# Patient Record
Sex: FEMALE | Employment: UNEMPLOYED | URBAN - METROPOLITAN AREA
[De-identification: names, ages, dates, MRNs, and addresses within clinical notes are randomized per-mention and may not be internally consistent; named-entity substitution may affect disease eponyms.]

---

## 2024-08-14 ENCOUNTER — TRANSFERRED RECORDS (OUTPATIENT)
Dept: HEALTH INFORMATION MANAGEMENT | Facility: CLINIC | Age: 59
End: 2024-08-14
Payer: COMMERCIAL

## 2024-08-14 LAB — PHQ9 SCORE: 16

## 2024-08-16 ENCOUNTER — MEDICAL CORRESPONDENCE (OUTPATIENT)
Dept: HEALTH INFORMATION MANAGEMENT | Facility: CLINIC | Age: 59
End: 2024-08-16
Payer: COMMERCIAL

## 2024-08-16 ENCOUNTER — TRANSCRIBE ORDERS (OUTPATIENT)
Dept: OTHER | Age: 59
End: 2024-08-16

## 2024-08-16 DIAGNOSIS — Z21 HIV POSITIVE (H): Primary | ICD-10-CM

## 2024-09-18 ENCOUNTER — OFFICE VISIT (OUTPATIENT)
Dept: INFECTIOUS DISEASES | Facility: CLINIC | Age: 59
End: 2024-09-18
Payer: COMMERCIAL

## 2024-09-18 VITALS
TEMPERATURE: 97.6 F | OXYGEN SATURATION: 96 % | HEART RATE: 50 BPM | WEIGHT: 184 LBS | SYSTOLIC BLOOD PRESSURE: 124 MMHG | DIASTOLIC BLOOD PRESSURE: 80 MMHG

## 2024-09-18 DIAGNOSIS — Z21 HIV POSITIVE (H): ICD-10-CM

## 2024-09-18 PROCEDURE — G2211 COMPLEX E/M VISIT ADD ON: HCPCS | Performed by: INTERNAL MEDICINE

## 2024-09-18 PROCEDURE — 99204 OFFICE O/P NEW MOD 45 MIN: CPT | Performed by: INTERNAL MEDICINE

## 2024-09-18 RX ORDER — DARUNAVIR ETHANOLATE AND COBICISTAT 800; 150 MG/1; MG/1
1 TABLET, FILM COATED ORAL DAILY
COMMUNITY
Start: 2024-03-15

## 2024-09-18 RX ORDER — BICTEGRAVIR SODIUM, EMTRICITABINE, AND TENOFOVIR ALAFENAMIDE FUMARATE 50; 200; 25 MG/1; MG/1; MG/1
1 TABLET ORAL DAILY
COMMUNITY
Start: 2024-03-15

## 2024-09-18 RX ORDER — CHOLECALCIFEROL (VITAMIN D3) 1250 MCG
50000 CAPSULE ORAL WEEKLY
COMMUNITY
Start: 2024-05-25

## 2024-09-18 RX ORDER — SERTRALINE HYDROCHLORIDE 100 MG/1
100 TABLET, FILM COATED ORAL DAILY
COMMUNITY

## 2024-09-18 RX ORDER — QUETIAPINE FUMARATE 100 MG/1
100 TABLET, FILM COATED ORAL 2 TIMES DAILY
COMMUNITY

## 2024-09-18 RX ORDER — ALPRAZOLAM 2 MG
2 TABLET ORAL
COMMUNITY
Start: 2024-06-27

## 2024-09-18 NOTE — PROGRESS NOTES
General ID Service Consult      Patient: Luda Pacheco  YOB: 1965, MRN: [id number]  Date of Admission:  (Not on file)  Date of Consult: 09/18/2024  Consult Requested by: No att. providers found  Admission Diagnosis: HIV positive (H) [Z21]  Consult Question:     ID Assessment & Plan   Patient diagnosed with HIV in 1994 with screening whilst pregnant, with CD4 bharat of 280?, on antiretroviral regimen of Bik/Prezcobix since 2018. Undetectable. CD4 537  Moved from [place]  QFG negative  Syphilis negative  HCV negative  Co-morbidities of insomnia, gastritis, depression/anxiety and migraines.   TIA  Becca  Just had a mammogram       PLAN    Flu shots yearly  Covid vaccine recommended  Followup 6 months with labs  Pap smear per primary      Chacha Conrad MD    ______________________________________________________________________        History of Present Illness   Luda Pacheco is a 59 year old female who was diagnosed with HIV in 1994 with screening whilst pregnant, with CD4 bharat of 280?, on antiretroviral regimen of Bik/Prezcobix since 2018. Co-morbidities of insomnia, gastritis, depression/anxiety and migraines.   She has moved here from [place] around June and now is established with the [facility] clinic.  [facility] feels fine, no GI issues no respiratory complaints  No fevers  No complications from HIV  Just had a mammogram   She reports menopause without period for 3 years but has had 1 vaginal bleed apparently about few months ago.  We discussed that she needs to have that checked out as soon as possible.    One partner male HIV negative    agnostic Review and Results:  Laboratory  - Cd4:  Lab Results   Component Value Date   CD4ABS 356 (L) 04/08/2022   CD4ABS 281 (L) 11/12/2021   CD4ABS 271 (L) 07/12/2021   OX9ROMIMGH 20.9 (L) 04/08/2022   GK2CTGTVYZ 20.4 (L) 11/12/2021   IA3YXBUOOP 23.7 (L) 07/12/2021     - HIV RNA Viral Load:  Lab Results   Component Value Date   RNAHIVCOP <20 (H) 06/07/2021    RNAHIVCOP <40 (H) 10/26/2020   HIVRNACOPNOT Not Detected 11/29/2012   COPIESML <20 Detected, Not Quantified (!) 04/08/2022   COPIESML <20 07/11/2016   COPIESML <20 03/10/2016       Meds  Medications prior to visit as below.  - Any changes to medications since last visit?: no  - Outside or OTC meds/supplements: no  - Medication AEs: n/a  - Medication Adherence: missed multiple doses monthly. Has had difficulty getting refills but has since switched pharmacies  Current Medications   Medication Sig    eiidgddcn-ttzucduo-xyrumup (BIKTARVY) -25 mg tab TAKE 1 TABLET BY MOUTH EVERY DAY    darunavir-cobicistat (PREZCOBIX) 800-150 mg-mg tablet tablet Take 1 tablet by mouth daily.    PURELAX 17 gram/dose powder TAKE 1 CAPFUL BY MOUTH ONCE FOR 1 DOSE. TAKE AS DIRECTED. SPLIT-BOWEL PREP DOSING.    brimonidine 0.33 % GlwP Apply 1 Pump topically daily. Apply thin layer to face, avoid eyes and lips, wash hands immediately after application; avoid open wounds    ALPRAZolam (XANAX) 1 mg tab TAKE 1 TO 2 TABLETS BY MOUTH AT BEDTIME AS NEEDED    QUEtiapine (SEROQUEL) 100 mg tab TAKE 0.5 1 TABLET BY MOUTH AT BEDTIME USING 1/2 1 TABLET AS NEEDED FOR SLEEP AND MOOD *LM NEW INS     mmunization History   Administered Date(s) Administered    Covid-19 (Moderna) 04/02/2021, 05/01/2021    Hepatitis B 03/01/2010, 03/01/2010, 05/19/2011    Hepatitis B, Adult 02/01/2010, 03/01/2010, 05/19/2011    Hepatitis B, Dialysis 12/04/2007, 03/06/2008, 06/19/2008    Influenza (36+ months; preservative-free) 01/13/2016, 12/22/2016    Influenza (6-35 months) 12/04/2007, 11/19/2009    Pneumococcal conjugate PCV13 04/21/2014    Pneumococcal polysaccharide PPV23 03/06/2008, 10/05/2010    Tdap 10/05/2010    influenza 10/05/2010, 12/13/2012, 10/28/2014    influenza (6+ months; preservative-free) 02/06/2019         Review of Systems   The 10 point Review of Systems is negative other than noted in the HPI or here.     Past Medical History    No past medical  "history on file.    Past Surgical History   No past surgical history on file.    Social History   Social History     Tobacco Use    Smoking status: Never    Tobacco comments:     no second hand smoke   Substance Use Topics    Alcohol use: Yes     Comment: occassionally    Drug use: No       Family History     FAMILY HISTORY      Medications   I have reviewed this patient's current medications    Allergies   No Known Allergies    Physical Exam   Vital Signs:                    Weight: 0 lbs 0 oz    Gen. appearance nontoxic  Eyes no conjunctivitis or icterus  Neck no stiffness or neck vein distention, no LN  Heart  no S3 no mumurs  Lungs clear no wheeze  Abdomen soft not tender  Extremities no synovitis, trace edema  Skin  no rash or emboli  Neurologic alert oriented no focal deficits      Data   omponent  Ref Range & Units 3 mo ago   HIV RNA Copies  0 - 0 Copies/mL <20 High    HIV RNA Log  Log(copies/mL) <1.30     Inflammatory Markers No lab results found.     Hematology Studies No lab results found.    Metabolic Studies No lab results found.    Hepatic Studies  No lab results found.    Most Recent 6 Bacteria Isolates From Any Culture (See EPIC Reports for Culture Details):No lab results found.    Urine Studies  No lab results found.    Vancomycin Levels  No lab results found.    Invalid input(s): \"VANCO\"    Hepatitis B Testing No lab results found.  Hepatitis C Testing   No results found for: \"HCVAB\", \"HQTG\", \"HCGENO\", \"HCPCR\", \"HQTRNA\", \"HEPRNA\"  HIVTesting No lab results found.    Respiratory Virus Testing    No results found for: \"RS\", \"FLUAG\"  COVID-19 Antibody Results, Testing for Immunity           No data to display              COVID-19 PCR Results           No data to display                "

## 2024-10-08 ENCOUNTER — LAB REQUISITION (OUTPATIENT)
Dept: LAB | Facility: CLINIC | Age: 59
End: 2024-10-08
Payer: COMMERCIAL

## 2024-10-08 DIAGNOSIS — Z12.4 ENCOUNTER FOR SCREENING FOR MALIGNANT NEOPLASM OF CERVIX: ICD-10-CM

## 2024-10-08 PROCEDURE — G0145 SCR C/V CYTO,THINLAYER,RESCR: HCPCS | Mod: ORL | Performed by: NURSE PRACTITIONER

## 2024-10-08 PROCEDURE — 87624 HPV HI-RISK TYP POOLED RSLT: CPT | Mod: ORL | Performed by: NURSE PRACTITIONER

## 2024-10-09 LAB
HPV HR 12 DNA CVX QL NAA+PROBE: NEGATIVE
HPV16 DNA CVX QL NAA+PROBE: NEGATIVE
HPV18 DNA CVX QL NAA+PROBE: NEGATIVE
HUMAN PAPILLOMA VIRUS FINAL DIAGNOSIS: NORMAL

## 2024-10-11 LAB
BKR AP ASSOCIATED HPV REPORT: NORMAL
BKR LAB AP GYN ADEQUACY: NORMAL
BKR LAB AP GYN INTERPRETATION: NORMAL
BKR LAB AP LMP: NORMAL
BKR LAB AP PREVIOUS ABNL DX: NORMAL
BKR LAB AP PREVIOUS ABNORMAL: NORMAL
PATH REPORT.COMMENTS IMP SPEC: NORMAL
PATH REPORT.COMMENTS IMP SPEC: NORMAL
PATH REPORT.RELEVANT HX SPEC: NORMAL

## 2024-11-07 ENCOUNTER — LAB REQUISITION (OUTPATIENT)
Dept: LAB | Facility: CLINIC | Age: 59
End: 2024-11-07
Payer: COMMERCIAL

## 2024-11-07 DIAGNOSIS — R30.0 DYSURIA: ICD-10-CM

## 2024-11-07 PROCEDURE — 87186 SC STD MICRODIL/AGAR DIL: CPT | Mod: ORL | Performed by: FAMILY MEDICINE

## 2024-11-07 PROCEDURE — 87088 URINE BACTERIA CULTURE: CPT | Mod: ORL | Performed by: FAMILY MEDICINE

## 2024-11-11 LAB
BACTERIA UR CULT: ABNORMAL
BACTERIA UR CULT: ABNORMAL

## 2025-03-05 ENCOUNTER — TELEPHONE (OUTPATIENT)
Dept: INFECTIOUS DISEASES | Facility: CLINIC | Age: 60
End: 2025-03-05

## 2025-03-05 DIAGNOSIS — Z21 HIV POSITIVE (H): Primary | ICD-10-CM

## 2025-03-05 RX ORDER — DARUNAVIR ETHANOLATE AND COBICISTAT 800; 150 MG/1; MG/1
1 TABLET, FILM COATED ORAL DAILY
Qty: 30 TABLET | Refills: 0 | Status: SHIPPED | OUTPATIENT
Start: 2025-03-05

## 2025-03-05 RX ORDER — BICTEGRAVIR SODIUM, EMTRICITABINE, AND TENOFOVIR ALAFENAMIDE FUMARATE 50; 200; 25 MG/1; MG/1; MG/1
1 TABLET ORAL DAILY
Qty: 30 TABLET | Refills: 0 | Status: SHIPPED | OUTPATIENT
Start: 2025-03-05

## 2025-03-05 NOTE — TELEPHONE ENCOUNTER
Per pharmacy, there is a drug interaction between the PREZCOBIX and the ALPRAZolam prescribed by a different provider. Pharmacy would like a call back at [phone] to confirm provider is aware before filling.

## 2025-03-05 NOTE — TELEPHONE ENCOUNTER
I called, pt has been on this regimen since 2018. I advise they call the prescriber of the alprazolam to discuss.

## 2025-03-05 NOTE — TELEPHONE ENCOUNTER
[facility] Call Center    Phone Message    May a detailed message be left on voicemail: yes     Reason for Call: Medication Refill Request    Has the patient contacted the pharmacy for the refill? Yes   Name of medication being requested: bictegravir-emtricitabine-tenofovir (BIKTARVY) -25 MG per tablet  &   darunavir-cobicistat (PREZCOBIX) 800-150 MG per tablet  Provider who prescribed the medication: Dr Conrad  Pharmacy: [facility] off of [facility]   Date medication is needed: 3/5    Action Taken: Message routed to:  Other: [facility] ID    Travel Screening: Not Applicable     Date of Service: 3/5

## 2025-03-10 ENCOUNTER — LAB (OUTPATIENT)
Dept: LAB | Facility: CLINIC | Age: 60
End: 2025-03-10
Attending: INTERNAL MEDICINE
Payer: COMMERCIAL

## 2025-03-10 DIAGNOSIS — Z21 HIV POSITIVE (H): ICD-10-CM

## 2025-03-10 LAB
BASOPHILS # BLD AUTO: 0 10E3/UL (ref 0–0.2)
BASOPHILS NFR BLD AUTO: 1 %
EOSINOPHIL # BLD AUTO: 0 10E3/UL (ref 0–0.7)
EOSINOPHIL NFR BLD AUTO: 0 %
ERYTHROCYTE [DISTWIDTH] IN BLOOD BY AUTOMATED COUNT: 15.1 % (ref 10–15)
HCT VFR BLD AUTO: 42.5 % (ref 35–47)
HGB BLD-MCNC: 14.1 G/DL (ref 11.7–15.7)
IMM GRANULOCYTES # BLD: 0 10E3/UL
IMM GRANULOCYTES NFR BLD: 0 %
LYMPHOCYTES # BLD AUTO: 1.8 10E3/UL (ref 0.8–5.3)
LYMPHOCYTES NFR BLD AUTO: 38 %
MCH RBC QN AUTO: 32.4 PG (ref 26.5–33)
MCHC RBC AUTO-ENTMCNC: 33.2 G/DL (ref 31.5–36.5)
MCV RBC AUTO: 98 FL (ref 78–100)
MONOCYTES # BLD AUTO: 0.5 10E3/UL (ref 0–1.3)
MONOCYTES NFR BLD AUTO: 10 %
NEUTROPHILS # BLD AUTO: 2.4 10E3/UL (ref 1.6–8.3)
NEUTROPHILS NFR BLD AUTO: 52 %
PLATELET # BLD AUTO: 253 10E3/UL (ref 150–450)
RBC # BLD AUTO: 4.35 10E6/UL (ref 3.8–5.2)
T PALLIDUM AB SER QL: NONREACTIVE
WBC # BLD AUTO: 4.7 10E3/UL (ref 4–11)

## 2025-03-10 PROCEDURE — 86360 T CELL ABSOLUTE COUNT/RATIO: CPT

## 2025-03-10 PROCEDURE — 85025 COMPLETE CBC W/AUTO DIFF WBC: CPT

## 2025-03-10 PROCEDURE — 36415 COLL VENOUS BLD VENIPUNCTURE: CPT

## 2025-03-10 PROCEDURE — 80053 COMPREHEN METABOLIC PANEL: CPT

## 2025-03-10 PROCEDURE — 87536 HIV-1 QUANT&REVRSE TRNSCRPJ: CPT

## 2025-03-10 PROCEDURE — 86359 T CELLS TOTAL COUNT: CPT

## 2025-03-10 PROCEDURE — 86780 TREPONEMA PALLIDUM: CPT

## 2025-03-11 LAB
ALBUMIN SERPL BCG-MCNC: 3.8 G/DL (ref 3.5–5.2)
ALP SERPL-CCNC: 105 U/L (ref 40–150)
ALT SERPL W P-5'-P-CCNC: 16 U/L (ref 0–50)
ANION GAP SERPL CALCULATED.3IONS-SCNC: 10 MMOL/L (ref 7–15)
AST SERPL W P-5'-P-CCNC: 24 U/L (ref 0–45)
BILIRUB SERPL-MCNC: 0.4 MG/DL
BUN SERPL-MCNC: 12.4 MG/DL (ref 6–20)
CALCIUM SERPL-MCNC: 9.7 MG/DL (ref 8.8–10.4)
CD3 CELLS # BLD: 1257 CELLS/UL (ref 603–2990)
CD3 CELLS NFR BLD: 72 % (ref 49–84)
CD3+CD4+ CELLS # BLD: 289 CELLS/UL (ref 441–2156)
CD3+CD4+ CELLS NFR BLD: 17 % (ref 28–63)
CD3+CD4+ CELLS/CD3+CD8+ CLL BLD: 0.31 % (ref 1.4–2.6)
CD3+CD8+ CELLS # BLD: 918 CELLS/UL (ref 125–1312)
CD3+CD8+ CELLS NFR BLD: 53 % (ref 10–40)
CHLORIDE SERPL-SCNC: 108 MMOL/L (ref 98–107)
CREAT SERPL-MCNC: 0.9 MG/DL (ref 0.51–0.95)
EGFRCR SERPLBLD CKD-EPI 2021: 74 ML/MIN/1.73M2
GLUCOSE SERPL-MCNC: 107 MG/DL (ref 70–99)
HCO3 SERPL-SCNC: 22 MMOL/L (ref 22–29)
POTASSIUM SERPL-SCNC: 4.5 MMOL/L (ref 3.4–5.3)
PROT SERPL-MCNC: 7.5 G/DL (ref 6.4–8.3)
SODIUM SERPL-SCNC: 140 MMOL/L (ref 135–145)
T CELL COMMENT: ABNORMAL

## 2025-03-12 LAB
HIV1 RNA # PLAS NAA DL=20: 622 COPIES/ML
HIV1 RNA SERPL NAA+PROBE-LOG#: 2.8 {LOG_COPIES}/ML

## 2025-04-22 ENCOUNTER — TELEPHONE (OUTPATIENT)
Dept: LAB | Facility: CLINIC | Age: 60
End: 2025-04-22
Payer: COMMERCIAL

## 2025-04-22 DIAGNOSIS — Z21 HIV POSITIVE (H): Primary | ICD-10-CM

## 2025-04-22 NOTE — PROGRESS NOTES
Patient has upcoming lab appointment. No orders in chart. Left message on patient's phone to reach out to provider directly/ bring paper order.

## 2025-04-23 ENCOUNTER — OFFICE VISIT (OUTPATIENT)
Dept: INFECTIOUS DISEASES | Facility: CLINIC | Age: 60
End: 2025-04-23
Payer: COMMERCIAL

## 2025-04-23 ENCOUNTER — LAB (OUTPATIENT)
Dept: LAB | Facility: CLINIC | Age: 60
End: 2025-04-23
Payer: COMMERCIAL

## 2025-04-23 DIAGNOSIS — Z21 HIV POSITIVE (H): ICD-10-CM

## 2025-04-23 DIAGNOSIS — Z21 HIV POSITIVE (H): Primary | ICD-10-CM

## 2025-04-23 LAB
ALBUMIN SERPL BCG-MCNC: 3.5 G/DL (ref 3.5–5.2)
ALP SERPL-CCNC: 102 U/L (ref 40–150)
ALT SERPL W P-5'-P-CCNC: 20 U/L (ref 0–50)
ANION GAP SERPL CALCULATED.3IONS-SCNC: 10 MMOL/L (ref 7–15)
AST SERPL W P-5'-P-CCNC: 30 U/L (ref 0–45)
BASOPHILS # BLD AUTO: 0 10E3/UL (ref 0–0.2)
BASOPHILS NFR BLD AUTO: 1 %
BILIRUB SERPL-MCNC: 0.5 MG/DL
BUN SERPL-MCNC: 9.6 MG/DL (ref 6–20)
CALCIUM SERPL-MCNC: 9.1 MG/DL (ref 8.8–10.4)
CD3 CELLS # BLD: 1521 CELLS/UL (ref 603–2990)
CD3 CELLS NFR BLD: 69 % (ref 49–84)
CD3+CD4+ CELLS # BLD: 351 CELLS/UL (ref 441–2156)
CD3+CD4+ CELLS NFR BLD: 16 % (ref 28–63)
CD3+CD4+ CELLS/CD3+CD8+ CLL BLD: 0.32 % (ref 1.4–2.6)
CD3+CD8+ CELLS # BLD: 1097 CELLS/UL (ref 125–1312)
CD3+CD8+ CELLS NFR BLD: 50 % (ref 10–40)
CHLORIDE SERPL-SCNC: 107 MMOL/L (ref 98–107)
CREAT SERPL-MCNC: 0.75 MG/DL (ref 0.51–0.95)
EGFRCR SERPLBLD CKD-EPI 2021: >90 ML/MIN/1.73M2
EOSINOPHIL # BLD AUTO: 0 10E3/UL (ref 0–0.7)
EOSINOPHIL NFR BLD AUTO: 1 %
ERYTHROCYTE [DISTWIDTH] IN BLOOD BY AUTOMATED COUNT: 16.5 % (ref 10–15)
GLUCOSE SERPL-MCNC: 100 MG/DL (ref 70–99)
HCO3 SERPL-SCNC: 25 MMOL/L (ref 22–29)
HCT VFR BLD AUTO: 38.7 % (ref 35–47)
HGB BLD-MCNC: 12.9 G/DL (ref 11.7–15.7)
IMM GRANULOCYTES # BLD: 0 10E3/UL
IMM GRANULOCYTES NFR BLD: 0 %
LYMPHOCYTES # BLD AUTO: 2 10E3/UL (ref 0.8–5.3)
LYMPHOCYTES NFR BLD AUTO: 48 %
MCH RBC QN AUTO: 34.1 PG (ref 26.5–33)
MCHC RBC AUTO-ENTMCNC: 33.3 G/DL (ref 31.5–36.5)
MCV RBC AUTO: 102 FL (ref 78–100)
MONOCYTES # BLD AUTO: 0.5 10E3/UL (ref 0–1.3)
MONOCYTES NFR BLD AUTO: 11 %
NEUTROPHILS # BLD AUTO: 1.7 10E3/UL (ref 1.6–8.3)
NEUTROPHILS NFR BLD AUTO: 40 %
PLATELET # BLD AUTO: 222 10E3/UL (ref 150–450)
POTASSIUM SERPL-SCNC: 4.2 MMOL/L (ref 3.4–5.3)
PROT SERPL-MCNC: 6.9 G/DL (ref 6.4–8.3)
RBC # BLD AUTO: 3.78 10E6/UL (ref 3.8–5.2)
SODIUM SERPL-SCNC: 142 MMOL/L (ref 135–145)
T CELL COMMENT: ABNORMAL
WBC # BLD AUTO: 4.2 10E3/UL (ref 4–11)

## 2025-04-23 PROCEDURE — 87536 HIV-1 QUANT&REVRSE TRNSCRPJ: CPT

## 2025-04-23 PROCEDURE — 99214 OFFICE O/P EST MOD 30 MIN: CPT | Performed by: INTERNAL MEDICINE

## 2025-04-23 PROCEDURE — 36415 COLL VENOUS BLD VENIPUNCTURE: CPT

## 2025-04-23 PROCEDURE — 86359 T CELLS TOTAL COUNT: CPT

## 2025-04-23 PROCEDURE — 80053 COMPREHEN METABOLIC PANEL: CPT

## 2025-04-23 PROCEDURE — 86360 T CELL ABSOLUTE COUNT/RATIO: CPT

## 2025-04-23 PROCEDURE — 85025 COMPLETE CBC W/AUTO DIFF WBC: CPT

## 2025-04-23 RX ORDER — ALBUTEROL SULFATE 90 UG/1
2 INHALANT RESPIRATORY (INHALATION) EVERY 6 HOURS PRN
COMMUNITY

## 2025-04-23 RX ORDER — TOPIRAMATE 50 MG/1
1 TABLET, FILM COATED ORAL
COMMUNITY
Start: 2025-04-16

## 2025-04-23 RX ORDER — BICTEGRAVIR SODIUM, EMTRICITABINE, AND TENOFOVIR ALAFENAMIDE FUMARATE 50; 200; 25 MG/1; MG/1; MG/1
1 TABLET ORAL DAILY
Qty: 90 TABLET | Refills: 3 | Status: SHIPPED | OUTPATIENT
Start: 2025-04-23 | End: 2026-04-18

## 2025-04-23 RX ORDER — ESZOPICLONE 3 MG/1
3 TABLET, FILM COATED ORAL
COMMUNITY
Start: 2025-04-16

## 2025-04-23 RX ORDER — DARUNAVIR ETHANOLATE AND COBICISTAT 800; 150 MG/1; MG/1
1 TABLET, FILM COATED ORAL
Qty: 90 TABLET | Refills: 3 | Status: SHIPPED | OUTPATIENT
Start: 2025-04-23 | End: 2026-04-18

## 2025-04-23 RX ORDER — RAMELTEON 8 MG/1
TABLET, FILM COATED ORAL
COMMUNITY
Start: 2025-04-16

## 2025-04-23 NOTE — PROGRESS NOTES
"[facility] Infectious Disease  Followup Visit        Assessment:     Patient diagnosed with HIV in 1994 with screening whilst pregnant, with CD4 bharat of 280?, on antiretroviral regimen of Bik/Prezcobix since 2018 per records  Was Undetectable. CD4 537  Moved from [place]  March 2025 CD4 289,  , off meds x 2 months  QFG negative  Syphilis negative  HCV negative  Co-morbidities of insomnia, gastritis, depression/anxiety and migraines.   TIA  Vapes      Lupus?, not on meds  seizures, anxiety, and depression        Plan:     On prezcobix and biktravy    I just sent multiple refills to [facility]  Discussed importance of being on meds consistently  Encouraged followup with primary   and vaccine updates      Followup in 3 months with labs  Check JOSE  TSH, cortisol, lipid panel  UA    Chacha Conrad M.D.                Present Illness:   This is a 59 years old female with HIV presenting for follow-up.  Unfortunately she says  \"Run out of the medicines\" x2 months, starting December 2024 and apparently she had difficulty getting her refills no I do see a phone call in March with us sending prescription starting December.   Back on med since March  Going thru menopause  She reports dysgeusia with new meds prescribed by other providers  No cough sob fever diarrhea     Prior HIV history    Dx: 1994, Reason: screening for pregnancy  Risk: Denies hx IVDU  CD4 Bharat: 280?  HIV OIs/Sx: denies  HIV Dx Code Z21  GENOTYPE: 6/14/2007 WT.     Date ARV Rx CD4 #/%, VL Resistance/ Comments/ AEs  2007 Norvir + Lexiva + Trizivir  2014 Prezcobix, Intellence, Isentress Nucleoside An., NRTI - M184, M41L, L210W, T215Y, M41I, NNRTI - Y188L, A98G, PIs I47V, L63P, M46I, V32I, V77I, V82I; INITGRASE INHIBITOR RESISTANCE: None  2018 Biktarvy + Prezcobix    After leaving out [facility], patient saw Dr. Gee Barros in [place], then moved to [place] to see Yoni Lucero MD [facility] infectious disease clinic.    Off meds since about June 2020. \"    Review of " Systems  Performed and all negative except as mentioned above.    Past medical, family, and social history reviewed, unchanged from before.        Gen. appearance nontoxic  Eyes no conjunctivitis or icterus  Heart no s3  Lungs clear  Abdomen soft not tender  Extremities no synovitis, trace edema  Skin  no rash or emboli  Neurologic alert oriented no focal deficits        DATA   No results found for any visits on 04/23/25.      Chacha Conrad MD, MD

## 2025-04-24 LAB — HIV1 RNA # PLAS NAA DL=20: NOT DETECTED COPIES/ML

## 2025-05-11 ENCOUNTER — HEALTH MAINTENANCE LETTER (OUTPATIENT)
Age: 60
End: 2025-05-11

## 2025-07-30 ENCOUNTER — LAB REQUISITION (OUTPATIENT)
Dept: LAB | Facility: CLINIC | Age: 60
End: 2025-07-30
Payer: MEDICAID

## 2025-07-30 DIAGNOSIS — R41.3 OTHER AMNESIA: ICD-10-CM

## 2025-07-30 PROCEDURE — 80053 COMPREHEN METABOLIC PANEL: CPT | Mod: ORL | Performed by: NURSE PRACTITIONER

## 2025-07-30 PROCEDURE — 84443 ASSAY THYROID STIM HORMONE: CPT | Mod: ORL | Performed by: NURSE PRACTITIONER

## 2025-07-30 PROCEDURE — 82607 VITAMIN B-12: CPT | Mod: ORL | Performed by: NURSE PRACTITIONER

## 2025-07-30 PROCEDURE — 84439 ASSAY OF FREE THYROXINE: CPT | Mod: ORL | Performed by: NURSE PRACTITIONER

## 2025-07-31 LAB
ALBUMIN SERPL BCG-MCNC: 3.6 G/DL (ref 3.5–5.2)
ALP SERPL-CCNC: 113 U/L (ref 40–150)
ALT SERPL W P-5'-P-CCNC: 83 U/L (ref 0–50)
ANION GAP SERPL CALCULATED.3IONS-SCNC: 10 MMOL/L (ref 7–15)
AST SERPL W P-5'-P-CCNC: 281 U/L (ref 0–45)
BILIRUB SERPL-MCNC: 0.4 MG/DL
BUN SERPL-MCNC: 15.9 MG/DL (ref 6–20)
CALCIUM SERPL-MCNC: 9 MG/DL (ref 8.8–10.4)
CHLORIDE SERPL-SCNC: 105 MMOL/L (ref 98–107)
CREAT SERPL-MCNC: 0.77 MG/DL (ref 0.51–0.95)
EGFRCR SERPLBLD CKD-EPI 2021: 89 ML/MIN/1.73M2
GLUCOSE SERPL-MCNC: 113 MG/DL (ref 70–99)
HCO3 SERPL-SCNC: 23 MMOL/L (ref 22–29)
POTASSIUM SERPL-SCNC: 3.8 MMOL/L (ref 3.4–5.3)
PROT SERPL-MCNC: 7.1 G/DL (ref 6.4–8.3)
SODIUM SERPL-SCNC: 138 MMOL/L (ref 135–145)
T4 FREE SERPL-MCNC: 0.81 NG/DL (ref 0.9–1.7)
TSH SERPL DL<=0.005 MIU/L-ACNC: 5.17 UIU/ML (ref 0.3–4.2)
VIT B12 SERPL-MCNC: <150 PG/ML (ref 232–1245)